# Patient Record
Sex: FEMALE | Race: BLACK OR AFRICAN AMERICAN | NOT HISPANIC OR LATINO | Employment: UNEMPLOYED | URBAN - METROPOLITAN AREA
[De-identification: names, ages, dates, MRNs, and addresses within clinical notes are randomized per-mention and may not be internally consistent; named-entity substitution may affect disease eponyms.]

---

## 2022-10-28 PROCEDURE — 88305 TISSUE EXAM BY PATHOLOGIST: CPT | Mod: TC,ORL | Performed by: INTERNAL MEDICINE

## 2022-10-31 ENCOUNTER — LAB REQUISITION (OUTPATIENT)
Dept: LAB | Facility: CLINIC | Age: 77
End: 2022-10-31
Payer: MEDICARE

## 2022-10-31 DIAGNOSIS — K92.0 HEMATEMESIS: ICD-10-CM

## 2022-11-01 LAB
PATH REPORT.COMMENTS IMP SPEC: NORMAL
PATH REPORT.COMMENTS IMP SPEC: NORMAL
PATH REPORT.FINAL DX SPEC: NORMAL
PATH REPORT.GROSS SPEC: NORMAL
PATH REPORT.MICROSCOPIC SPEC OTHER STN: NORMAL
PATH REPORT.RELEVANT HX SPEC: NORMAL
PHOTO IMAGE: NORMAL

## 2022-11-01 PROCEDURE — 88305 TISSUE EXAM BY PATHOLOGIST: CPT | Mod: 26 | Performed by: PATHOLOGY

## 2023-05-19 ENCOUNTER — OFFICE VISIT (OUTPATIENT)
Dept: URGENT CARE | Facility: URGENT CARE | Age: 78
End: 2023-05-19
Payer: COMMERCIAL

## 2023-05-19 VITALS
RESPIRATION RATE: 15 BRPM | OXYGEN SATURATION: 98 % | HEART RATE: 67 BPM | TEMPERATURE: 97.2 F | SYSTOLIC BLOOD PRESSURE: 104 MMHG | DIASTOLIC BLOOD PRESSURE: 68 MMHG

## 2023-05-19 DIAGNOSIS — H20.9 UVEITIS OF BOTH EYES: Primary | ICD-10-CM

## 2023-05-19 PROCEDURE — 99203 OFFICE O/P NEW LOW 30 MIN: CPT | Performed by: PHYSICIAN ASSISTANT

## 2023-05-19 RX ORDER — PREDNISOLONE ACETATE 10 MG/ML
1-2 SUSPENSION/ DROPS OPHTHALMIC 4 TIMES DAILY
Qty: 2 ML | Refills: 0 | Status: SHIPPED | OUTPATIENT
Start: 2023-05-19 | End: 2023-05-24

## 2023-05-19 ASSESSMENT — ENCOUNTER SYMPTOMS
EYE DISCHARGE: 1
EYE PAIN: 1
PHOTOPHOBIA: 1
EYE ITCHING: 0

## 2023-05-19 NOTE — PROGRESS NOTES
Assessment & Plan        1. Uveitis of both eyes  -Patient with symptoms of eye pain, eye tearing, photophobia and changes in vision.  Patient conjunctival normal bilaterally.  She has no purulent discharge.  I am suspecting uveitis vs conjunctivitis.  I will have patient follow-up with with Ophthalmologist for further evaluation.  I will start patient on prednisolone for 5 days.  - Adult Eye  Referral; Future  - prednisoLONE acetate (PRED FORTE) 1 % ophthalmic suspension; Place 1-2 drops into both eyes 4 times daily for 5 days  Dispense: 2 mL; Refill: 0    Patient Instructions   See Eye doctor for further evaluation      Return for Follow up, with eye doctor.    At the end of the encounter, I discussed results, diagnosis, medications. Discussed red flags for immediate return to clinic/ER, as well as indications for follow up if no improvement. Patient`s son  understood and agreed to plan. Patient was stable for discharge.    Nhung Hallman is a 78 year old female who presents to clinic today with son for the following health issues:  Chief Complaint   Patient presents with     Eye Problem     For the last 2 weeks Pts lt eye has been leaking/watering, a little swollen, sometimes painful, trouble with vision      HPI    Patient`s son reports eye tearing, pain and swelling for 2 days. Patient has a history of cataract surgery two years ago. Patient last saw her eye doctor one year ago. Patient wears eye glasses. Patient`s son reports vision changes on the right eye. Son denies cold or cough symptoms, eye itching, purulent discharge    Review of Systems   Eyes: Positive for photophobia, pain, discharge and visual disturbance. Negative for itching.       Problem List:  There are no relevant problems documented for this patient.      No past medical history on file.    Social History     Tobacco Use     Smoking status: Not on file     Smokeless tobacco: Not on file   Vaping Use     Vaping status: Not on  file   Substance Use Topics     Alcohol use: Not on file           Objective    /68 (BP Location: Right arm, Patient Position: Sitting, Cuff Size: Adult Large)   Pulse 67   Temp 97.2  F (36.2  C) (Temporal)   Resp 15   SpO2 98%   Physical Exam  Vitals and nursing note reviewed.   Constitutional:       Appearance: Normal appearance.   HENT:      Head: Normocephalic.      Nose: Nose normal.   Eyes:      General: Lids are normal.         Right eye: Discharge present.         Left eye: Discharge present.     Extraocular Movements: Extraocular movements intact.      Conjunctiva/sclera:      Right eye: Right conjunctiva is not injected.      Left eye: Left conjunctiva is not injected.      Pupils: Pupils are equal, round, and reactive to light.      Comments: Clear eye discharge bilaterally   Cardiovascular:      Rate and Rhythm: Normal rate and regular rhythm.   Pulmonary:      Effort: Pulmonary effort is normal.      Breath sounds: Normal breath sounds.   Musculoskeletal:      Cervical back: Normal range of motion and neck supple.   Lymphadenopathy:      Cervical: No cervical adenopathy.   Neurological:      Mental Status: She is alert and oriented to person, place, and time.   Psychiatric:         Mood and Affect: Mood normal.         Behavior: Behavior normal.              Vanna Meade PA-C

## 2023-05-26 ENCOUNTER — OFFICE VISIT (OUTPATIENT)
Dept: OPHTHALMOLOGY | Facility: CLINIC | Age: 78
End: 2023-05-26
Attending: STUDENT IN AN ORGANIZED HEALTH CARE EDUCATION/TRAINING PROGRAM
Payer: COMMERCIAL

## 2023-05-26 DIAGNOSIS — H02.883 MEIBOMIAN GLAND DYSFUNCTION (MGD) OF BOTH EYES: Primary | ICD-10-CM

## 2023-05-26 DIAGNOSIS — H35.89 MACULAR ATROPHY, RETINAL: ICD-10-CM

## 2023-05-26 DIAGNOSIS — H35.373 EPIRETINAL MEMBRANE (ERM) OF BOTH EYES: ICD-10-CM

## 2023-05-26 DIAGNOSIS — H04.123 DRY EYE SYNDROME, BILATERAL: ICD-10-CM

## 2023-05-26 DIAGNOSIS — H02.886 MEIBOMIAN GLAND DYSFUNCTION (MGD) OF BOTH EYES: Primary | ICD-10-CM

## 2023-05-26 PROCEDURE — 92004 COMPRE OPH EXAM NEW PT 1/>: CPT | Mod: GC | Performed by: STUDENT IN AN ORGANIZED HEALTH CARE EDUCATION/TRAINING PROGRAM

## 2023-05-26 PROCEDURE — 92134 CPTRZ OPH DX IMG PST SGM RTA: CPT | Performed by: STUDENT IN AN ORGANIZED HEALTH CARE EDUCATION/TRAINING PROGRAM

## 2023-05-26 PROCEDURE — G0463 HOSPITAL OUTPT CLINIC VISIT: HCPCS | Performed by: STUDENT IN AN ORGANIZED HEALTH CARE EDUCATION/TRAINING PROGRAM

## 2023-05-26 PROCEDURE — 92134 CPTRZ OPH DX IMG PST SGM RTA: CPT | Mod: 26 | Performed by: STUDENT IN AN ORGANIZED HEALTH CARE EDUCATION/TRAINING PROGRAM

## 2023-05-26 ASSESSMENT — CONF VISUAL FIELD
OD_SUPERIOR_NASAL_RESTRICTION: 0
OD_INFERIOR_NASAL_RESTRICTION: 0
OS_INFERIOR_NASAL_RESTRICTION: 3
OS_INFERIOR_TEMPORAL_RESTRICTION: 3
OD_SUPERIOR_TEMPORAL_RESTRICTION: 0
OD_INFERIOR_TEMPORAL_RESTRICTION: 0
OD_NORMAL: 1
OS_SUPERIOR_NASAL_RESTRICTION: 3
OS_SUPERIOR_TEMPORAL_RESTRICTION: 3

## 2023-05-26 ASSESSMENT — TONOMETRY
IOP_METHOD: TONOPEN
OS_IOP_MMHG: 18
OD_IOP_MMHG: 14

## 2023-05-26 ASSESSMENT — VISUAL ACUITY
OS_CC: 20/100
OD_CC: 20/70
CORRECTION_TYPE: GLASSES
METHOD: SNELLEN - LINEAR

## 2023-05-26 ASSESSMENT — REFRACTION_WEARINGRX
OS_SPHERE: -2.25
OD_SPHERE: -0.50
OD_AXIS: 149
OS_CYLINDER: +1.50
OS_AXIS: 024
OD_CYLINDER: +2.50

## 2023-05-26 ASSESSMENT — EXTERNAL EXAM - LEFT EYE: OS_EXAM: NORMAL

## 2023-05-26 ASSESSMENT — EXTERNAL EXAM - RIGHT EYE: OD_EXAM: NORMAL

## 2023-05-26 ASSESSMENT — SLIT LAMP EXAM - LIDS
COMMENTS: MGD
COMMENTS: MGD

## 2023-05-26 ASSESSMENT — CUP TO DISC RATIO
OD_RATIO: 0.5
OS_RATIO: 0.5

## 2023-05-26 NOTE — PROGRESS NOTES
HPI     Eye Pain Left Eye      In left eye.  Pain was noted as 5/10.  Occurring intermittently.  It is worse throughout the day.  Duration of 2 years.  Since onset it is gradually worsening.  Associated symptoms include photophobia and tearing (LE).  Negative for foreign body sensation and itching.  Treatments tried include eye drops. Additional comments: Painful LE X 2 years  Pain got to a 10 last week went to ER given drops doing better  Brynn Manning COA 7:55 AM May 26, 2023             Last edited by Brynn Manning on 5/26/2023  7:55 AM.        This visit was facilitated with the use of a [employer] .    She says she was seen in January earlier this year. Since then she has developed a lot of tearing and eye pain. This is associated with swelling and decrease in vision. The left eye is worse than the right eye.    She takes lubricating eye drops. She was in the ED recently, was given steroid drops for a 5 day course. She completed them yesterday and she says she got better. However she feels now her symptoms have regressed a bit since stopping drops yesterday.     Review of systems for the eyes was negative other than the pertinent positives/negatives listed in the HPI.    Ocular Meds: none    Ocular Hx: refractive error OU pseudophakia OU    FOHx: no known family history of glaucoma or blindness    PMHx:   Past Medical History:   Diagnosis Date     Hypertension        Assessment & Plan     Viji HECTOR Bowles is a 78 year old female with the following diagnoses:    MGD/Dry Eyes OU  - Has eye pain/discomfort with tearing; proparacaine helps alleviate symptoms; clinically with signs of dry eyes/mgd; seen in urgent care and thought to have conjunctivitis vs uveitis,  And started on predforte QID OU x 5- days; but no signs of either today  - start warm compresses BID OU x 5-10 min each time  - start PFATs QID and PRN OU    ERM OU  Macular Atrophy left eye  - minimal ERM OD, noted on OCT macula  - Decreased vision in  left eye suspected due to retinal atrophy and tractional band in macula;  - amsler precautions  - Referral to retina for evaluation for left atrophy and thickened tractional membrane on the macula    Counseled return/RD precautions    Kimberly Greer MD  Ophthalmology Resident PGY4  [facility]     Seen with Dr. Frazier.    Patient disposition:   Return for next available retina, or sooner changes.      Attending Physician Attestation:  Complete documentation of historical and exam elements from today's encounter can be found in the full encounter summary report (not reduplicated in this progress note).  I personally obtained the chief complaint(s) and history of present illness.  I confirmed and edited as necessary the review of systems, past medical/surgical history, family history, social history, and examination findings as documented by others; and I examined the patient myself.  I personally reviewed the relevant tests, images, and reports as documented above.  I formulated and edited as necessary the assessment and plan and discussed the findings and management plan with the patient and family. Attending Physician Image/Testing Attestation: I personally reviewed the ophthalmic test(s) associated with this encounter, agree with the interpretation(s) as documented by the resident/fellow, and have edited the corresponding report(s) as necessary. - Shala Frazier MD

## 2023-05-26 NOTE — NURSING NOTE
Chief Complaints and History of Present Illnesses   Patient presents with     Eye Pain Left Eye     Painful LE X 2 years  Pain got to a 10 last week went to ER given drops doing better  Brynn RUIZ 7:55 AM May 26, 2023        Chief Complaint(s) and History of Present Illness(es)     Eye Pain Left Eye            Laterality: In left eye    Pain scale: 5/10    Frequency: intermittently    Timing: throughout the day    Duration: 2 years    Course: gradually worsening    Associated symptoms: photophobia and tearing (LE).  Negative for foreign body sensation and itching    Treatments tried: eye drops    Comments: Painful LE X 2 years  Pain got to a 10 last week went to ER given drops doing better  Brynn RUIZ 7:55 AM May 26, 2023

## 2023-06-05 ENCOUNTER — TELEPHONE (OUTPATIENT)
Dept: OPHTHALMOLOGY | Facility: CLINIC | Age: 78
End: 2023-06-05
Payer: COMMERCIAL

## 2023-06-05 NOTE — TELEPHONE ENCOUNTER
EMILIO with direct call back number provided through an . Dr. Frazier requested a retina surgeon be consulted for pt's condition.    I left an option for a morning offering or afternoon offering on Thursday, 6/15, and I scheduled an afternoon option in order to hold the appt slot for now. Will try the pt back if she does not return my call and will cancel the appt that was previously scheduled with Dr. Betts after speaking with the pt directly.    SARA Martinez 3:15 PM June 5, 2023

## 2023-06-13 ENCOUNTER — APPOINTMENT (OUTPATIENT)
Dept: INTERPRETER SERVICES | Facility: CLINIC | Age: 78
End: 2023-06-13
Payer: COMMERCIAL

## 2023-06-13 NOTE — TELEPHONE ENCOUNTER
LVM through  again regarding appointment this Thursday, 6/15, at 12:10. Left direct call back number to confirm appt.    SARA Martinez 9:10 AM June 13, 2023

## 2023-06-15 ENCOUNTER — OFFICE VISIT (OUTPATIENT)
Dept: OPHTHALMOLOGY | Facility: CLINIC | Age: 78
End: 2023-06-15
Attending: OPHTHALMOLOGY
Payer: COMMERCIAL

## 2023-06-15 DIAGNOSIS — H35.373 EPIRETINAL MEMBRANE (ERM) OF BOTH EYES: ICD-10-CM

## 2023-06-15 DIAGNOSIS — H04.123 DRY EYE SYNDROME, BILATERAL: ICD-10-CM

## 2023-06-15 DIAGNOSIS — H43.822 VITREOMACULAR TRACTION SYNDROME OF LEFT EYE: Primary | ICD-10-CM

## 2023-06-15 DIAGNOSIS — H35.89 MACULAR ATROPHY, RETINAL: ICD-10-CM

## 2023-06-15 PROCEDURE — G0463 HOSPITAL OUTPT CLINIC VISIT: HCPCS | Performed by: OPHTHALMOLOGY

## 2023-06-15 PROCEDURE — 99214 OFFICE O/P EST MOD 30 MIN: CPT | Mod: GC | Performed by: OPHTHALMOLOGY

## 2023-06-15 ASSESSMENT — VISUAL ACUITY
METHOD: SNELLEN - LINEAR
OS_CC: 20/150
OD_CC: 20/40

## 2023-06-15 ASSESSMENT — TONOMETRY
OD_IOP_MMHG: 13
OS_IOP_MMHG: 17
IOP_METHOD: TONOPEN

## 2023-06-15 ASSESSMENT — EXTERNAL EXAM - LEFT EYE: OS_EXAM: NORMAL

## 2023-06-15 ASSESSMENT — CONF VISUAL FIELD
OS_SUPERIOR_NASAL_RESTRICTION: 1
OD_SUPERIOR_TEMPORAL_RESTRICTION: 0
OD_INFERIOR_NASAL_RESTRICTION: 0
OS_INFERIOR_TEMPORAL_RESTRICTION: 3
OD_INFERIOR_TEMPORAL_RESTRICTION: 0
OD_SUPERIOR_NASAL_RESTRICTION: 0
OD_NORMAL: 1
OS_SUPERIOR_TEMPORAL_RESTRICTION: 3
OS_INFERIOR_NASAL_RESTRICTION: 3

## 2023-06-15 ASSESSMENT — EXTERNAL EXAM - RIGHT EYE: OD_EXAM: NORMAL

## 2023-06-15 ASSESSMENT — SLIT LAMP EXAM - LIDS
COMMENTS: MGD
COMMENTS: MGD

## 2023-06-15 ASSESSMENT — CUP TO DISC RATIO
OS_RATIO: 0.5
OD_RATIO: 0.5

## 2023-06-15 NOTE — NURSING NOTE
Chief Complaints and History of Present Illnesses   Patient presents with     Retinal Evaluation     evaluation for left atrophy and thickened tractional membrane on the macula     Chief Complaint(s) and History of Present Illness(es)     Retinal Evaluation            Comments: evaluation for left atrophy and thickened tractional membrane on the macula          Comments    Ref by Dr Frazier  Pt states no change in VA since last visit  Pt states no flashes or floaters   States eye pain and swelling X 4 months. %/10 on the pain scale    Alis Arcos COT 12:17 PM Jane 15, 2023

## 2023-06-15 NOTE — LETTER
6/15/2023       RE: Viji Bowles  [address]  [address]  [place] [address]     Dear Colleague,    Thank you for referring your patient, Viji Bowles, to the [facility] at [facility]. Please see a copy of my visit note below.      CC: ERM each eye, left retinal atrophy with tractional membrane    Interval: First appointment with me    HPI: Viji Bowles is a 78 year old year-old patient with history of pseudophakia each eye who was referred by Dr. Frazier for ERM of both eyes with left retinal atrophy and thickened tractional membrane over macula.     Today, patient reports no change in VA since last visit, left eye has been blurry for 5-6 months. Pt states no flashes or floaters. States eye pain and tearing X 4 months, left worse than right. Not currently taking any eyedrops.     PMH: HTN, HLD, pre-DM (A1c was 5.9 in 2018).       Retinal Imaging:    Prior OCT Mac 5/26/23  RE: VMA without traction, trace ERM, no IRF/SRF, good foveal contour.   LE: ERM, VMT with traction and lamellar hole inferiorly, with mild cysts inferiorly, otherwise no IRF/SRF. Atrophy of outer retinal layers and IS/OS at fovea with exudates.        Assessment & Plan:    # VMT with lamellar macular hole, OS  # Epiretinal membrane, and Retinal atrophy, OS  - Symptomatic for approximately 6 months.   - Significant distortion of foveal contour.   - Discuss possibility of PPV/membrane peel OS, including uncertain visual prognosis because of  outer retinal atrophy. Patient wishes to consider options, observe for now and will discuss further on follow-up.     # ERM each eye  - Mild OD, significant VMT OS as above.   - Will monitor right eye for now.     # Pseudophakia each eye  - Monitor    # MGD/Dry eyes each eye  - Start PFATs QID each eye as instructed by Dr. Frazier.     RTC:   Visit for refraction next available.   Retina 3 months VTD Optical Coherence Tomography  macula.     Vasiliy Alvarez MD  Ophthalmology, PGY-3      ~~~~~~~~~~~~~~~~~~~~~~~~~~~~~~~~~~   Complete documentation of historical and exam elements from today's encounter can be found in the full encounter summary report (not reduplicated in this progress note).  I personally obtained the chief complaint(s) and history of present illness.  I confirmed and edited as necessary the review of systems, past medical/surgical history, family history, social history, and examination findings as documented by others; and I examined the patient myself.  I personally reviewed the relevant tests, images, and reports as documented above.  I personally reviewed the ophthalmic test(s) associated with this encounter, agree with the interpretation(s) as documented by the resident/fellow, and have edited the corresponding report(s) as necessary.   I formulated and edited as necessary the assessment and plan and discussed the findings and management plan with the patient and family. Vanna Quiñones MD    Again, thank you for allowing me to participate in the care of your patient.      Sincerely,    Vanna Quiñones M.D.  Professor of Ophthalmology  Vitreoretinal Surgeon  [facility] Endowed Chair  Department of Ophthalmology & Visual Neurosciences  [facility]  Phone:  [phone]   Fax:  [phone]

## 2023-09-07 DIAGNOSIS — H35.373 EPIRETINAL MEMBRANE (ERM) OF BOTH EYES: Primary | ICD-10-CM

## 2024-03-17 ENCOUNTER — APPOINTMENT (OUTPATIENT)
Dept: GENERAL RADIOLOGY | Facility: CLINIC | Age: 79
End: 2024-03-17
Attending: INTERNAL MEDICINE
Payer: COMMERCIAL

## 2024-03-17 ENCOUNTER — HOSPITAL ENCOUNTER (EMERGENCY)
Facility: CLINIC | Age: 79
Discharge: HOME | End: 2024-03-17
Attending: INTERNAL MEDICINE | Admitting: INTERNAL MEDICINE
Payer: COMMERCIAL

## 2024-03-17 VITALS
WEIGHT: 200 LBS | HEIGHT: 60 IN | BODY MASS INDEX: 39.27 KG/M2 | SYSTOLIC BLOOD PRESSURE: 151 MMHG | RESPIRATION RATE: 18 BRPM | DIASTOLIC BLOOD PRESSURE: 93 MMHG | TEMPERATURE: 97.6 F | OXYGEN SATURATION: 99 % | HEART RATE: 90 BPM

## 2024-03-17 DIAGNOSIS — M54.9 MUSCULOSKELETAL BACK PAIN: ICD-10-CM

## 2024-03-17 DIAGNOSIS — N39.0 ACUTE UTI: ICD-10-CM

## 2024-03-17 DIAGNOSIS — J06.9 ACUTE URI: ICD-10-CM

## 2024-03-17 LAB
ALBUMIN SERPL BCG-MCNC: 4 G/DL (ref 3.5–5.2)
ALBUMIN UR-MCNC: NEGATIVE MG/DL
ALP SERPL-CCNC: 135 U/L (ref 40–150)
ALT SERPL W P-5'-P-CCNC: 10 U/L (ref 0–50)
ANION GAP SERPL CALCULATED.3IONS-SCNC: 11 MMOL/L (ref 7–15)
APPEARANCE UR: CLEAR
AST SERPL W P-5'-P-CCNC: 18 U/L (ref 0–45)
BASOPHILS # BLD AUTO: 0 10E3/UL (ref 0–0.2)
BASOPHILS NFR BLD AUTO: 1 %
BILIRUB SERPL-MCNC: 0.3 MG/DL
BILIRUB UR QL STRIP: NEGATIVE
BUN SERPL-MCNC: 24.8 MG/DL (ref 8–23)
CALCIUM SERPL-MCNC: 9.4 MG/DL (ref 8.8–10.2)
CHLORIDE SERPL-SCNC: 105 MMOL/L (ref 98–107)
CK SERPL-CCNC: 37 U/L (ref 26–192)
COLOR UR AUTO: ABNORMAL
CREAT SERPL-MCNC: 1.01 MG/DL (ref 0.51–0.95)
CRP SERPL-MCNC: 3.78 MG/L
DEPRECATED HCO3 PLAS-SCNC: 25 MMOL/L (ref 22–29)
EGFRCR SERPLBLD CKD-EPI 2021: 56 ML/MIN/1.73M2
EOSINOPHIL # BLD AUTO: 0.1 10E3/UL (ref 0–0.7)
EOSINOPHIL NFR BLD AUTO: 2 %
ERYTHROCYTE [DISTWIDTH] IN BLOOD BY AUTOMATED COUNT: 13.5 % (ref 10–15)
ERYTHROCYTE [SEDIMENTATION RATE] IN BLOOD BY WESTERGREN METHOD: 34 MM/HR (ref 0–30)
FLUAV RNA SPEC QL NAA+PROBE: NEGATIVE
FLUBV RNA RESP QL NAA+PROBE: NEGATIVE
GLUCOSE SERPL-MCNC: 94 MG/DL (ref 70–99)
GLUCOSE UR STRIP-MCNC: NEGATIVE MG/DL
HCT VFR BLD AUTO: 40.8 % (ref 35–47)
HGB BLD-MCNC: 13.2 G/DL (ref 11.7–15.7)
HGB UR QL STRIP: NEGATIVE
IMM GRANULOCYTES # BLD: 0 10E3/UL
IMM GRANULOCYTES NFR BLD: 0 %
KETONES UR STRIP-MCNC: NEGATIVE MG/DL
LEUKOCYTE ESTERASE UR QL STRIP: ABNORMAL
LYMPHOCYTES # BLD AUTO: 2.2 10E3/UL (ref 0.8–5.3)
LYMPHOCYTES NFR BLD AUTO: 39 %
MCH RBC QN AUTO: 30.2 PG (ref 26.5–33)
MCHC RBC AUTO-ENTMCNC: 32.4 G/DL (ref 31.5–36.5)
MCV RBC AUTO: 93 FL (ref 78–100)
MONOCYTES # BLD AUTO: 0.5 10E3/UL (ref 0–1.3)
MONOCYTES NFR BLD AUTO: 8 %
MUCOUS THREADS #/AREA URNS LPF: PRESENT /LPF
NEUTROPHILS # BLD AUTO: 2.8 10E3/UL (ref 1.6–8.3)
NEUTROPHILS NFR BLD AUTO: 50 %
NITRATE UR QL: NEGATIVE
NRBC # BLD AUTO: 0 10E3/UL
NRBC BLD AUTO-RTO: 0 /100
PH UR STRIP: 5 [PH] (ref 5–7)
PLATELET # BLD AUTO: 278 10E3/UL (ref 150–450)
POTASSIUM SERPL-SCNC: 4.8 MMOL/L (ref 3.4–5.3)
PROT SERPL-MCNC: 7.6 G/DL (ref 6.4–8.3)
RBC # BLD AUTO: 4.37 10E6/UL (ref 3.8–5.2)
RBC URINE: 1 /HPF
RSV RNA SPEC NAA+PROBE: NEGATIVE
SARS-COV-2 RNA RESP QL NAA+PROBE: NEGATIVE
SODIUM SERPL-SCNC: 141 MMOL/L (ref 135–145)
SP GR UR STRIP: 1.01 (ref 1–1.03)
SQUAMOUS EPITHELIAL: 1 /HPF
TRANSITIONAL EPI: 1 /HPF
UROBILINOGEN UR STRIP-MCNC: NORMAL MG/DL
WBC # BLD AUTO: 5.7 10E3/UL (ref 4–11)
WBC URINE: 13 /HPF

## 2024-03-17 PROCEDURE — 82550 ASSAY OF CK (CPK): CPT | Performed by: INTERNAL MEDICINE

## 2024-03-17 PROCEDURE — 87086 URINE CULTURE/COLONY COUNT: CPT | Performed by: INTERNAL MEDICINE

## 2024-03-17 PROCEDURE — 86140 C-REACTIVE PROTEIN: CPT | Performed by: INTERNAL MEDICINE

## 2024-03-17 PROCEDURE — 81001 URINALYSIS AUTO W/SCOPE: CPT | Performed by: INTERNAL MEDICINE

## 2024-03-17 PROCEDURE — 71046 X-RAY EXAM CHEST 2 VIEWS: CPT

## 2024-03-17 PROCEDURE — 99284 EMERGENCY DEPT VISIT MOD MDM: CPT | Mod: 25 | Performed by: INTERNAL MEDICINE

## 2024-03-17 PROCEDURE — 36415 COLL VENOUS BLD VENIPUNCTURE: CPT | Performed by: INTERNAL MEDICINE

## 2024-03-17 PROCEDURE — 71046 X-RAY EXAM CHEST 2 VIEWS: CPT | Mod: 26 | Performed by: STUDENT IN AN ORGANIZED HEALTH CARE EDUCATION/TRAINING PROGRAM

## 2024-03-17 PROCEDURE — 87637 SARSCOV2&INF A&B&RSV AMP PRB: CPT | Performed by: INTERNAL MEDICINE

## 2024-03-17 PROCEDURE — 80053 COMPREHEN METABOLIC PANEL: CPT | Performed by: INTERNAL MEDICINE

## 2024-03-17 PROCEDURE — 85652 RBC SED RATE AUTOMATED: CPT | Performed by: INTERNAL MEDICINE

## 2024-03-17 PROCEDURE — 99284 EMERGENCY DEPT VISIT MOD MDM: CPT | Performed by: INTERNAL MEDICINE

## 2024-03-17 PROCEDURE — 250N000013 HC RX MED GY IP 250 OP 250 PS 637: Performed by: INTERNAL MEDICINE

## 2024-03-17 PROCEDURE — 85025 COMPLETE CBC W/AUTO DIFF WBC: CPT | Performed by: INTERNAL MEDICINE

## 2024-03-17 RX ORDER — KETOROLAC TROMETHAMINE 15 MG/ML
15 INJECTION, SOLUTION INTRAMUSCULAR; INTRAVENOUS ONCE
Status: COMPLETED | OUTPATIENT
Start: 2024-03-17 | End: 2024-03-17

## 2024-03-17 RX ORDER — LEVOFLOXACIN 500 MG/1
500 TABLET, FILM COATED ORAL DAILY
Qty: 7 TABLET | Refills: 0 | Status: SHIPPED | OUTPATIENT
Start: 2024-03-17 | End: 2024-03-24

## 2024-03-17 RX ORDER — IBUPROFEN 600 MG/1
600 TABLET, FILM COATED ORAL EVERY 6 HOURS PRN
Qty: 30 TABLET | Refills: 0 | Status: SHIPPED | OUTPATIENT
Start: 2024-03-17

## 2024-03-17 RX ORDER — IBUPROFEN 600 MG/1
600 TABLET, FILM COATED ORAL ONCE
Status: COMPLETED | OUTPATIENT
Start: 2024-03-17 | End: 2024-03-17

## 2024-03-17 RX ADMIN — IBUPROFEN 600 MG: 600 TABLET, FILM COATED ORAL at 14:44

## 2024-03-17 ASSESSMENT — COLUMBIA-SUICIDE SEVERITY RATING SCALE - C-SSRS
1. IN THE PAST MONTH, HAVE YOU WISHED YOU WERE DEAD OR WISHED YOU COULD GO TO SLEEP AND NOT WAKE UP?: NO
6. HAVE YOU EVER DONE ANYTHING, STARTED TO DO ANYTHING, OR PREPARED TO DO ANYTHING TO END YOUR LIFE?: NO
2. HAVE YOU ACTUALLY HAD ANY THOUGHTS OF KILLING YOURSELF IN THE PAST MONTH?: NO

## 2024-03-17 ASSESSMENT — ACTIVITIES OF DAILY LIVING (ADL)
ADLS_ACUITY_SCORE: 35
ADLS_ACUITY_SCORE: 35
ADLS_ACUITY_SCORE: 33

## 2024-03-17 NOTE — ED PROVIDER NOTES
[place] EMERGENCY DEPARTMENT ([facility])    3/17/24       ED PROVIDER NOTE  VTA      History     Chief Complaint   Patient presents with    Back Pain     The history is provided by the patient, medical records and a relative. The history is limited by a language barrier. No  was used (Son translated).     Viji Bowles is a 79 year old female with a past medical history significant for lumbago and HTN who presents to the Emergency Department for evaluation of back pain. Patient presents with son who translates and provides history. Patient states for the last four days she has been having worsening back pain from her lower back all the way up her shoulders. She is now unable to turn her body without aggravating her pain. She denies recent heavy lifting. She states she recently came back from [place] on February 29th and she was sitting down for a long time during this. She denies chest pain, leg pain or trouble breathing. She states she has been coughing which originally aggravated her back pain. She denies recent fever. She has not had pain like this before.     Past Medical History  Past Medical History:   Diagnosis Date    Hypertension      Past Surgical History:   Procedure Laterality Date    CATARACT IOL, RT/LT      LA DISCISSION,2ND CATARACT,LASER Left 08/29/2014    Procedure: LASER YAG CAPSULOTOMY;  Surgeon: Shane CULP MD;  Location: [facility];  Service: Ophthalmology     ibuprofen (ADVIL/MOTRIN) 600 MG tablet  levofloxacin (LEVAQUIN) 500 MG tablet  glycerin-hypromellose- (VISINE) 0.2-0.2-1 % SOLN ophthalmic solution      No Known Allergies  Family History  No family history on file.  Social History   Tobacco Use    Passive exposure: Never    Smokeless tobacco: Never      Past medical history, past surgical history, medications, allergies, family history, and social history were reviewed with the patient. No additional pertinent items.      A medically  appropriate review of systems was performed with pertinent positives and negatives noted in the HPI, and all other systems negative.    Physical Exam   BP: 135/82  Pulse: 91  Temp: 98  F (36.7  C)  Resp: 16  Height: 152.4 cm (5')  Weight: 90.7 kg (200 lb)  SpO2: 98 %  Physical Exam  Vitals and nursing note reviewed.   Constitutional:       General: She is not in acute distress.     Appearance: She is not diaphoretic.   HENT:      Head: Normocephalic and atraumatic.      Nose: Congestion present.      Mouth/Throat:      Mouth: Mucous membranes are dry.   Eyes:      Extraocular Movements: Extraocular movements intact.      Conjunctiva/sclera: Conjunctivae normal.   Cardiovascular:      Rate and Rhythm: Normal rate and regular rhythm.      Heart sounds: Normal heart sounds. No murmur heard.     No friction rub. No gallop.   Pulmonary:      Effort: Pulmonary effort is normal. No respiratory distress.      Breath sounds: Normal breath sounds. No stridor. No wheezing, rhonchi or rales.   Chest:      Chest wall: No tenderness.   Abdominal:      General: Abdomen is flat. Bowel sounds are normal. There is no distension.      Palpations: Abdomen is soft. There is no mass.      Tenderness: There is no abdominal tenderness. There is no right CVA tenderness, left CVA tenderness, guarding or rebound.      Hernia: No hernia is present.   Musculoskeletal:         General: No tenderness. Normal range of motion.      Cervical back: Normal, normal range of motion and neck supple.      Thoracic back: Normal.      Lumbar back: Normal.        Back:       Comments: tender   Skin:     General: Skin is warm.      Findings: No rash.   Neurological:      General: No focal deficit present.      Cranial Nerves: No cranial nerve deficit.           ED Course, Procedures, & Data      Procedures            Results for orders placed or performed during the hospital encounter of 03/17/24   XR Chest 2 Views     Status: None    Narrative    EXAM: XR  CHEST 2 VIEWS, 3/17/2024 12:43 PM     HISTORY: pain cough       COMPARISON: None    FINDINGS:   Frontal and lateral views of the chest. Trachea is midline. Cardiac  silhouette is within normal limits. Aortic arch atherosclerosis. No  focal consolidation, pleural effusion or pneumothorax. Degenerative  changes in the spine.      Impression    IMPRESSION: No acute airspace opacity.    I have personally reviewed the examination and initial interpretation  and I agree with the findings.    HUANG HORN          SYSTEM ID:  [id number]   Comprehensive metabolic panel     Status: Abnormal   Result Value Ref Range    Sodium 141 135 - 145 mmol/L    Potassium 4.8 3.4 - 5.3 mmol/L    Carbon Dioxide (CO2) 25 22 - 29 mmol/L    Anion Gap 11 7 - 15 mmol/L    Urea Nitrogen 24.8 (H) 8.0 - 23.0 mg/dL    Creatinine 1.01 (H) 0.51 - 0.95 mg/dL    GFR Estimate 56 (L) >60 mL/min/1.73m2    Calcium 9.4 8.8 - 10.2 mg/dL    Chloride 105 98 - 107 mmol/L    Glucose 94 70 - 99 mg/dL    Alkaline Phosphatase 135 40 - 150 U/L    AST 18 0 - 45 U/L    ALT 10 0 - 50 U/L    Protein Total 7.6 6.4 - 8.3 g/dL    Albumin 4.0 3.5 - 5.2 g/dL    Bilirubin Total 0.3 <=1.2 mg/dL   CRP inflammation     Status: Normal   Result Value Ref Range    CRP Inflammation 3.78 <5.00 mg/L   Erythrocyte sedimentation rate auto     Status: Abnormal   Result Value Ref Range    Erythrocyte Sedimentation Rate 34 (H) 0 - 30 mm/hr   UA with Microscopic reflex to Culture     Status: Abnormal    Specimen: Urine, Clean Catch   Result Value Ref Range    Color Urine Light Yellow Colorless, Straw, Light Yellow, Yellow    Appearance Urine Clear Clear    Glucose Urine Negative Negative mg/dL    Bilirubin Urine Negative Negative    Ketones Urine Negative Negative mg/dL    Specific Gravity Urine 1.013 1.003 - 1.035    Blood Urine Negative Negative    pH Urine 5.0 5.0 - 7.0    Protein Albumin Urine Negative Negative mg/dL    Urobilinogen Urine Normal Normal, 2.0 mg/dL    Nitrite Urine  Negative Negative    Leukocyte Esterase Urine Moderate (A) Negative    Mucus Urine Present (A) None Seen /LPF    RBC Urine 1 <=2 /HPF    WBC Urine 13 (H) <=5 /HPF    Squamous Epithelials Urine 1 <=1 /HPF    Transitional Epithelials Urine 1 <=1 /HPF    Narrative    Urine Culture ordered based on laboratory criteria   CK total     Status: Normal   Result Value Ref Range    CK 37 26 - 192 U/L   Symptomatic Influenza A/B, RSV, & SARS-CoV2 PCR (COVID-19) Nasopharyngeal     Status: Normal    Specimen: Nasopharyngeal; Swab   Result Value Ref Range    Influenza A PCR Negative Negative    Influenza B PCR Negative Negative    RSV PCR Negative Negative    SARS CoV2 PCR Negative Negative    Narrative    Testing was performed using the Xpert Xpress CoV2/Flu/RSV Assay on the Cepheid GeneXpert Instrument. This test should be ordered for the detection of SARS-CoV-2, influenza, and RSV viruses in individuals who meet clinical and/or epidemiological criteria. Test performance is unknown in asymptomatic patients. This test is for in vitro diagnostic use under the FDA EUA for laboratories certified under CLIA to perform high or moderate complexity testing. This test has not been FDA cleared or approved. A negative result does not rule out the presence of PCR inhibitors in the specimen or target RNA in concentration below the limit of detection for the assay. If only one viral target is positive but coinfection with multiple targets is suspected, the sample should be re-tested with another FDA cleared, approved, or authorized test, if coinfection would change clinical management. This test was validated by the [facility]. These laboratories are certified under the Clinical Laboratory Improvement Amendments of 1988 (CLIA-88) as qualified to perform high complexity laboratory testing.   CBC with platelets and differential     Status: None   Result Value Ref Range    WBC Count 5.7 4.0 - 11.0 10e3/uL    RBC Count 4.37  3.80 - 5.20 10e6/uL    Hemoglobin 13.2 11.7 - 15.7 g/dL    Hematocrit 40.8 35.0 - 47.0 %    MCV 93 78 - 100 fL    MCH 30.2 26.5 - 33.0 pg    MCHC 32.4 31.5 - 36.5 g/dL    RDW 13.5 10.0 - 15.0 %    Platelet Count 278 150 - 450 10e3/uL    % Neutrophils 50 %    % Lymphocytes 39 %    % Monocytes 8 %    % Eosinophils 2 %    % Basophils 1 %    % Immature Granulocytes 0 %    NRBCs per 100 WBC 0 <1 /100    Absolute Neutrophils 2.8 1.6 - 8.3 10e3/uL    Absolute Lymphocytes 2.2 0.8 - 5.3 10e3/uL    Absolute Monocytes 0.5 0.0 - 1.3 10e3/uL    Absolute Eosinophils 0.1 0.0 - 0.7 10e3/uL    Absolute Basophils 0.0 0.0 - 0.2 10e3/uL    Absolute Immature Granulocytes 0.0 <=0.4 10e3/uL    Absolute NRBCs 0.0 10e3/uL   CBC with platelets differential     Status: None    Narrative    The following orders were created for panel order CBC with platelets differential.  Procedure                               Abnormality         Status                     ---------                               -----------         ------                     CBC with platelets and d...[[id number]]                      Final result                 Please view results for these tests on the individual orders.     Medications   ketorolac (TORADOL) injection 15 mg (15 mg Intravenous Not Given 3/17/24 1445)   ibuprofen (ADVIL/MOTRIN) tablet 600 mg (600 mg Oral $Given 3/17/24 1444)     Labs Ordered and Resulted from Time of ED Arrival to Time of ED Departure   COMPREHENSIVE METABOLIC PANEL - Abnormal       Result Value    Sodium 141      Potassium 4.8      Carbon Dioxide (CO2) 25      Anion Gap 11      Urea Nitrogen 24.8 (*)     Creatinine 1.01 (*)     GFR Estimate 56 (*)     Calcium 9.4      Chloride 105      Glucose 94      Alkaline Phosphatase 135      AST 18      ALT 10      Protein Total 7.6      Albumin 4.0      Bilirubin Total 0.3     ERYTHROCYTE SEDIMENTATION RATE AUTO - Abnormal    Erythrocyte Sedimentation Rate 34 (*)    ROUTINE UA WITH MICROSCOPIC REFLEX  TO CULTURE - Abnormal    Color Urine Light Yellow      Appearance Urine Clear      Glucose Urine Negative      Bilirubin Urine Negative      Ketones Urine Negative      Specific Gravity Urine 1.013      Blood Urine Negative      pH Urine 5.0      Protein Albumin Urine Negative      Urobilinogen Urine Normal      Nitrite Urine Negative      Leukocyte Esterase Urine Moderate (*)     Mucus Urine Present (*)     RBC Urine 1      WBC Urine 13 (*)     Squamous Epithelials Urine 1      Transitional Epithelials Urine 1     CRP INFLAMMATION - Normal    CRP Inflammation 3.78     CK TOTAL - Normal    CK 37     INFLUENZA A/B, RSV, & SARS-COV2 PCR - Normal    Influenza A PCR Negative      Influenza B PCR Negative      RSV PCR Negative      SARS CoV2 PCR Negative     CBC WITH PLATELETS AND DIFFERENTIAL    WBC Count 5.7      RBC Count 4.37      Hemoglobin 13.2      Hematocrit 40.8      MCV 93      MCH 30.2      MCHC 32.4      RDW 13.5      Platelet Count 278      % Neutrophils 50      % Lymphocytes 39      % Monocytes 8      % Eosinophils 2      % Basophils 1      % Immature Granulocytes 0      NRBCs per 100 WBC 0      Absolute Neutrophils 2.8      Absolute Lymphocytes 2.2      Absolute Monocytes 0.5      Absolute Eosinophils 0.1      Absolute Basophils 0.0      Absolute Immature Granulocytes 0.0      Absolute NRBCs 0.0     URINE CULTURE     XR Chest 2 Views   Final Result   IMPRESSION: No acute airspace opacity.      I have personally reviewed the examination and initial interpretation   and I agree with the findings.      HUANG HORN DO            SYSTEM ID:  [id number]             Critical care was not performed.     Medical Decision Making  The patient's presentation was of moderate complexity (an acute illness with systemic symptoms).    The patient's evaluation involved:  ordering and/or review of 3+ test(s) in this encounter (see separate area of note for details)    The patient's management necessitated moderate risk  (prescription drug management including medications given in the ED).    Assessment & Plan    Acute mid back pain right greater than left, UTI but no suggestion for Sepsis, COVID Flu and CXR neg, probably muscle strain with URI, will start levaquin for UTI, Ibuprofen prn for pain, follow up with her PMD in one week,    I have reviewed the nursing notes. I have reviewed the findings, diagnosis, plan and need for follow up with the patient.    Discharge Medication List as of 3/17/2024  2:37 PM        START taking these medications    Details   ibuprofen (ADVIL/MOTRIN) 600 MG tablet Take 1 tablet (600 mg) by mouth every 6 hours as needed for moderate pain, Disp-30 tablet, R-0, E-Prescribe      levofloxacin (LEVAQUIN) 500 MG tablet Take 1 tablet (500 mg) by mouth daily for 7 days, Disp-7 tablet, R-0, E-Prescribe             Final diagnoses:   Acute UTI   Acute URI   Musculoskeletal back pain   IJAMES, am serving as a trained medical scribe to document services personally performed by Melvina Plata MD, based on the provider's statements to me.      IMelvina MD, was physically present and have reviewed and verified the accuracy of this note documented by JAMES GUAJARDO.     Melvina Plata MD  [facility] EMERGENCY DEPARTMENT  3/17/2024        Melvina Plata MD  03/17/24 [id number]

## 2024-03-17 NOTE — ED TRIAGE NOTES
Triage Assessment & Note:    /82   Pulse 91   Temp 98  F (36.7  C) (Oral)   Resp 16   Ht 1.524 m (5')   Wt 90.7 kg (200 lb)   SpO2 98%   BMI 39.06 kg/m      Patient presents with: PT c/o back pain  x 4 days. PT denies any difficult voiding.     Home Treatments/Remedies: None    Febrile / Afebrile? Afebrile     Duration of C/o:  4 days    Enrico Jon RN  March 17, 2024       Triage Assessment (Adult)       Row Name 03/17/24 1140          Triage Assessment    Airway WDL WDL        Respiratory WDL    Respiratory WDL WDL        Cardiac WDL    Cardiac WDL WDL

## 2024-03-18 LAB — BACTERIA UR CULT: NORMAL
